# Patient Record
Sex: FEMALE | Race: WHITE | NOT HISPANIC OR LATINO | URBAN - METROPOLITAN AREA
[De-identification: names, ages, dates, MRNs, and addresses within clinical notes are randomized per-mention and may not be internally consistent; named-entity substitution may affect disease eponyms.]

---
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
## 2024-09-19 NOTE — PATIENT DISCUSSION
(H35.363) Drusen (degenerative) of macula, bilateral - Assesment : Examination revealed few small Drusen OU. - Plan : Monitor for changes. Advised patient to call our office with decreased vision or increased symptoms.  rtc as scheduled [Total (100%)] : total (100%) [Does not reveal pre-existing condition(s) that may affect treatment/prognosis] : does not reveal pre-existing condition(s) that may affect treatment/prognosis [Cannot return to work because ________] : cannot return to work because [unfilled] [Unknown at this time] : : unknown at this time [Patient] : patient [No Rx restrictions] : No Rx restrictions. [I provided the services listed above] :  I provided the services listed above.